# Patient Record
(demographics unavailable — no encounter records)

---

## 2024-10-07 NOTE — PROCEDURE
[FreeTextEntry1] :   Right wrist -Patient gave informed verbal consent regarding closed reduction and casting as well as expectations regarding pain management.  The wrist was locally hematoma blocked using 7 cc 1% lidocaine.  Using gentle traction and manipulation the distal radius was reduced.  A well-padded short arm fiberglass cast was applied and molded appropriately to maintain the reduction.  The patient tolerated the procedure well and there were no complications.  Postreduction x-rays were reviewed demonstrating improved alignment of the distal radius fracture.

## 2024-10-07 NOTE — HISTORY OF PRESENT ILLNESS
[FreeTextEntry1] : 64-year-old female presenting for evaluation of a right wrist fracture.  She sustained this injury over the weekend while she was lifting weights and fell at the gym landing on an outstretched hand.  She was seen in urgent care and placed into a splint, no reduction at the time.  She has a history of breast cancer but is currently in remission.  She has been diagnosed with osteopenia and is on vitamin D and calcium.

## 2024-10-07 NOTE — PHYSICAL EXAM
[de-identified] : Right hand and wrist Stiffness the MCP PIP joints, initially splinted, splint removed and skin examined no abrasions openings or open wounds.  Mild swelling throughout the wrist and hands Sensation intact median, ulnar, radial nerve distribution, denies sensory changes compared to the contralateral side Tenderness over the distal radius no tenderness throughout remainder of forearm mild stiffness in elbow and shoulder [de-identified] : Three-view x-ray post reduction of AP, lateral, oblique taken of the right wrist, these were personally reviewed these demonstrate improved alignment of her distal radius fracture, with acceptable height dorsal tilt.  There is dorsal comminution present.  There is SL widening  Initial injury films reviewed from urgent care 4 view including AP lateral and 2 obliques -these demonstrated a loss of volar tilt, with dorsal comminution fracture of the distal radius, SL widening

## 2024-10-07 NOTE — ASSESSMENT
[FreeTextEntry1] : 64-year-old female right distal radius fracture  -Patient has been reduced and is currently in acceptable nonoperative parameters, taking into account age as well as functionality/demand.  We discussed how nonoperative management entails short arm casting for duration approximately 4 to 5 weeks.  Will obtain x-rays every week for the first 3 weeks to ensure no displacement.  We discussed how it is displaced out of acceptable parameters surgery may be considered an option.  The patient will follow-up in 1 week. -  Detailed cast instructions given.  We discussed the importance of keeping the cast clean dry and intact.  We discussed if the cast is become wet they need to return immediately as this can cause skin compromise.  We discussed the importance of elevation and moving the digits for circulation.  We discussed warning signs including worsening pain, numbness, discoloration of the fingers and to elevate and call immediately if this is to occur. All questions answered to the best my ability. Cast bag recommendations given. -We discussed that these are fragility fractures, she should be on vitamin D and calcium supplementation.  Patient has previously received a DEXA scan.

## 2024-10-14 NOTE — ASSESSMENT
[FreeTextEntry1] : 64-year-old female right distal radius fracture  -Patient has been reduced and is currently in acceptable nonoperative parameters, taking into account age as well as functionality/demand.  We discussed how nonoperative management entails short arm casting for duration approximately 4 to 5 weeks.  Will obtain x-rays every week for the first 3 weeks to ensure no displacement.  We discussed how it is displaced out of acceptable parameters surgery may be considered an option.  The patient will follow-up in 1 week for repeat x-rays -She is developing stenosing tenosynovitis of the right ring finger.  I encouraged her to continue motion.  We discussed possible corticosteroid injection in a few weeks if the pain is persistent

## 2024-10-14 NOTE — PHYSICAL EXAM
[de-identified] : Right hand and wrist Improved swelling and motion at the DIP PIP and MCP joints.  Cast is feeling well and fitting well and clean dry and intact. Sensation intact median, ulnar, radial nerve distribution, denies sensory changes compared to the contralateral side Tenderness over the A1 pulley of the ring finger, slight flexion of the ring finger [de-identified] : Three-view x-ray post reduction of AP, lateral, oblique taken of the right wrist, these demonstrate maintained dorsal tilt and radial height from the close reduction last week, remains in nonoperative parameters.

## 2024-10-14 NOTE — HISTORY OF PRESENT ILLNESS
[FreeTextEntry1] : 64-year-old female presenting for evaluation of a right wrist fracture.  She is status post closed reduction last week and casting.  She is doing well.  Her swelling has improved.  Her pain is minimal.  She is having some ring finger pain.

## 2024-10-22 NOTE — HISTORY OF PRESENT ILLNESS
[FreeTextEntry1] : 64 female here for follow-up for right distal radius fracture.  She is status post closed reduction and casting 2 weeks ago.  She is doing well.  She has no complaints.  No issues with the cast.  She is going to [place] later this week.

## 2024-10-22 NOTE — PHYSICAL EXAM
[de-identified] : Right hand and wrist Improved swelling and motion at the DIP PIP and MCP joints.  Cast is feeling well and fitting well and clean dry and intact. Sensation intact median, ulnar, radial nerve distribution, denies sensory changes compared to the contralateral side  [de-identified] : Three-view x-ray post reduction of AP, lateral, oblique taken of the right wrist, these demonstrate maintained dorsal tilt and radial height from the close reduction last week, remains in nonoperative parameters, slight displacement of radial styloid fracture.

## 2024-10-22 NOTE — ASSESSMENT
[FreeTextEntry1] : 64-year-old female right distal radius fracture  -Patient has been reduced and is currently in acceptable nonoperative parameters, taking into account age as well as functionality/demand.  We discussed how nonoperative management entails short arm casting for duration approximately 4 to 5 weeks.  Will obtain x-rays every week for the first 3 weeks to ensure no displacement.  We discussed how it is displaced out of acceptable parameters surgery may be considered an option. the fracture has slightly moved however patient adamant she does not want to proceed with any sort of surgical intervention at this time, given the appearance of her bone quality as well as the small of the styloid fragment I think is reasonable to continue nonoperative management with close monitoring -Follow-up 10 days.

## 2024-10-30 NOTE — PHYSICAL EXAM
[de-identified] : Right hand and wrist Improved swelling and motion at the DIP PIP and MCP joints.  Cast is feeling well and fitting well and clean dry and intact. Sensation intact median, ulnar, radial nerve distribution, denies sensory changes compared to the contralateral side  [de-identified] : Three-view x-ray post reduction of AP, lateral, oblique taken of the right wrist, these demonstrate unchanged alignment from the prior films, slight dorsal tilt of the distal radius with dorsal comminution,

## 2024-10-30 NOTE — ASSESSMENT
[FreeTextEntry1] : 64-year-old female right distal radius fracture  -Patient has been reduced and is currently in acceptable nonoperative parameters, taking into account age as well as functionality/demand.  We discussed how nonoperative management entails short arm casting for duration approximately 4 to 5 weeks.  Will obtain x-rays every week for the first 3 weeks to ensure no displacement.  W -She will follow-up on the 2 weeks, hand therapy consult placed today to preschedule after the cast comes off

## 2024-10-30 NOTE — HISTORY OF PRESENT ILLNESS
[FreeTextEntry1] : 64 female here for follow-up for right distal radius fracture.  She is status post closed reduction and casting 3+ weeks ago.  She is doing well.  She has no complaints.  No issues with the cast.  She is returning from [place]

## 2024-11-12 NOTE — ASSESSMENT
[FreeTextEntry1] : 64-year-old female right distal radius fracture  -Patient has been reduced and is currently in acceptable nonoperative parameters, taking into account age as well as functionality/demand.  We discussed how nonoperative management entails short arm casting for duration approximately 4 to 5 weeks.   -She is status post 5 weeks of casting.  Clinically and radiographically she is healing well. -Transition to a removable wrist brace.  She has hand therapy scheduled for Friday. -Continue 5 to 10 pound weight restriction for the next month, then begin range of motion of the wrist -Follow-up 4 to 5 weeks.

## 2024-11-12 NOTE — PHYSICAL EXAM
[de-identified] : Right hand and wrist Improved swelling and motion at the DIP PIP and MCP joints.  Sensation intact median, ulnar, radial nerve distribution, denies sensory changes compared to the contralateral side No tenderness over the distal radius.  No tenderness over distal ulna.  She has stiffness with flexion extension and pronosupination compared to the contralateral side.  [de-identified] : Three-view x-ray post reduction of AP, lateral, oblique taken of the right wrist, these demonstrate unchanged alignment from the prior films, slight dorsal tilt of the distal radius with dorsal comminution with interval cast removal

## 2024-11-12 NOTE — HISTORY OF PRESENT ILLNESS
[FreeTextEntry1] : 64-year-old female presenting for evaluation of right distal radius fracture.  She is 5 weeks status post closed reduction and casting.  She reports improvement pain mild pain on the ulnar side of the wrist.  No issues with the cast.

## 2024-12-16 NOTE — HISTORY OF PRESENT ILLNESS
[FreeTextEntry1] : 64-year-old female presenting for evaluation of right distal radius fracture.  She is approximately 10 weeks out from injury.  She is having no pain at this time.  She has been going to hand therapy.

## 2024-12-16 NOTE — ASSESSMENT
[FreeTextEntry1] : 64-year-old female right distal radius fracture  -Patient is doing well she is approximately 10 weeks out from the injury.  She is having no pain.  She has regained complete symmetric range of motion the contralateral side clinically and radiographically she has healed.   -She has slowly wean out of the brace and is no longer wearing it.  She has been going to hand therapy twice a week.  She feels as if she has plateaued with therapy and she feels essentially back to normal at this time.  Recommended 1 more session or 2 to get a strengthening program that she can do at home.  She may return to all activity without restriction.  Given option for scheduled follow-up, they will call if there are any questions or concerns.

## 2024-12-16 NOTE — PHYSICAL EXAM
[de-identified] : Right hand and wrist Able make a full composite fist Supination pronation flexion and extension equal to the contralateral side Slight dorsal prominence of the ulna compared to the contralateral side No increased motion of the DRUJ compared to the contralateral side Digits warm well-perfused.  Denies numbness or tingling.  [de-identified] : Three-view x-ray right wrist of AP, lateral, oblique taken of the right wrist, these demonstrate unchanged alignment from the prior films, slight dorsal tilt of the distal radius with dorsal comminution with interval cast removal, she is slightly ulnar positive due to shortening of the radius

## 2024-12-18 NOTE — PHYSICAL EXAM
[de-identified] : Right hand and wrist Able make a full composite fist Supination pronation symmetric contralateral side, slight decrease in flexion extension compared to the contralateral side No increased motion of the DRUJ compared to the contralateral side Digits warm well-perfused.  Denies numbness or tingling.  Negative Tinel's at the wrist Trigger finger over the A1 pulley of the right ring finger palpable click no locking or catching.  No tenderness over the other A1 pulleys  No tenderness over the EPL tendon or EPL tendinitis or impending rupture evidence on examination.  [de-identified] : Three-view x-ray right wrist including AP, lateral, oblique were taken today and personally reviewed these demonstrate healed distal radius fracture, is in acceptable alignment, no clear arthritic change